# Patient Record
Sex: MALE | Race: WHITE | NOT HISPANIC OR LATINO | Employment: OTHER | URBAN - METROPOLITAN AREA
[De-identification: names, ages, dates, MRNs, and addresses within clinical notes are randomized per-mention and may not be internally consistent; named-entity substitution may affect disease eponyms.]

---

## 2019-09-16 ENCOUNTER — OFFICE VISIT (OUTPATIENT)
Dept: PULMONOLOGY | Facility: CLINIC | Age: 74
End: 2019-09-16

## 2019-09-16 VITALS
BODY MASS INDEX: 36.19 KG/M2 | SYSTOLIC BLOOD PRESSURE: 108 MMHG | OXYGEN SATURATION: 97 % | HEIGHT: 71 IN | TEMPERATURE: 98.2 F | DIASTOLIC BLOOD PRESSURE: 68 MMHG | HEART RATE: 70 BPM | RESPIRATION RATE: 18 BRPM | WEIGHT: 258.5 LBS

## 2019-09-16 DIAGNOSIS — R06.02 SHORTNESS OF BREATH: Primary | ICD-10-CM

## 2019-09-16 DIAGNOSIS — G47.33 OSA (OBSTRUCTIVE SLEEP APNEA): ICD-10-CM

## 2019-09-16 DIAGNOSIS — I25.5 ISCHEMIC CARDIOMYOPATHY: ICD-10-CM

## 2019-09-16 DIAGNOSIS — I48.0 PAROXYSMAL ATRIAL FIBRILLATION (HCC): ICD-10-CM

## 2019-09-16 PROBLEM — I25.10 CAD (CORONARY ARTERY DISEASE): Status: ACTIVE | Noted: 2019-09-16

## 2019-09-16 PROBLEM — E78.5 HYPERLIPEMIA: Status: ACTIVE | Noted: 2019-09-16

## 2019-09-16 PROBLEM — E03.9 HYPOTHYROIDISM (ACQUIRED): Status: ACTIVE | Noted: 2019-09-16

## 2019-09-16 PROBLEM — E78.2 MIXED HYPERLIPIDEMIA: Status: ACTIVE | Noted: 2019-09-16

## 2019-09-16 PROCEDURE — 99203 OFFICE O/P NEW LOW 30 MIN: CPT | Performed by: NURSE PRACTITIONER

## 2019-09-16 PROCEDURE — 94726 PLETHYSMOGRAPHY LUNG VOLUMES: CPT | Performed by: NURSE PRACTITIONER

## 2019-09-16 PROCEDURE — 94010 BREATHING CAPACITY TEST: CPT | Performed by: NURSE PRACTITIONER

## 2019-09-16 PROCEDURE — 94729 DIFFUSING CAPACITY: CPT | Performed by: NURSE PRACTITIONER

## 2019-09-16 RX ORDER — METOPROLOL SUCCINATE 50 MG/1
TABLET, EXTENDED RELEASE ORAL
Refills: 5 | COMMUNITY
Start: 2019-07-05

## 2019-09-16 RX ORDER — APIXABAN 5 MG/1
5 TABLET, FILM COATED ORAL 2 TIMES DAILY
Refills: 1 | COMMUNITY
Start: 2019-07-25

## 2019-09-16 RX ORDER — ATORVASTATIN CALCIUM 20 MG/1
20 TABLET, FILM COATED ORAL NIGHTLY
Refills: 1 | COMMUNITY
Start: 2019-08-09

## 2019-09-16 RX ORDER — LEVOTHYROXINE SODIUM 0.12 MG/1
TABLET ORAL
Refills: 1 | COMMUNITY
Start: 2019-07-26

## 2019-09-16 RX ORDER — AMIODARONE HYDROCHLORIDE 200 MG/1
100 TABLET ORAL DAILY
Refills: 3 | COMMUNITY
Start: 2019-07-15

## 2019-09-16 RX ORDER — NITROGLYCERIN 0.4 MG/1
0.4 TABLET SUBLINGUAL
COMMUNITY

## 2019-09-16 RX ORDER — FEBUXOSTAT 40 MG/1
40 TABLET ORAL DAILY
Refills: 3 | COMMUNITY
Start: 2019-07-26

## 2019-09-16 NOTE — PROGRESS NOTES
[facility] Pulmonary Evaluation    CHIEF COMPLAINT    Shortness of breath     Refered by:  See Jarquin MD        HISTORY OF PRESENT ILLNESS    Artur Rose is a 74 y.o.male here today for evaluation of some dyspnea.  Actually his dyspnea has improved since his heart failure has been managed.  He does have a history of coronary disease with acute MIs and stenting and heart failure.  At this time he denies any shortness of breath.  He walks on a frequent basis.  He has an occasional cough but rare sputum production mostly in the morning secondary to sinus drainage.    He does have a history of thyroid dysfunction secondary to his amiodarone on Synthroid.    He did have a chronic cough to ACE inhibitors in the past but once discontinued it stopped.    He has no history of tobacco abuse.  No significant occupational exposures.          Patient Active Problem List   Diagnosis   • Paroxysmal atrial fibrillation (CMS/HCC)   • Mixed hyperlipidemia   • Hypothyroidism (acquired)   • CAD (coronary artery disease)   • Ischemic cardiomyopathy   • GHAZALA (obstructive sleep apnea)       Allergies   Allergen Reactions   • Codeine Rash       Current Outpatient Medications:   •  amiodarone (PACERONE) 200 MG tablet, Take 200 mg by mouth Every 12 (Twelve) Hours., Disp: , Rfl: 3  •  atorvastatin (LIPITOR) 20 MG tablet, Take 20 mg by mouth Every Night., Disp: , Rfl: 1  •  ELIQUIS 5 MG tablet tablet, Take 5 mg by mouth 2 (Two) Times a Day., Disp: , Rfl: 1  •  levothyroxine (SYNTHROID, LEVOTHROID) 125 MCG tablet, TAKE ONE TABLET BY MOUTH EACH MORNING ON AN EMPTY STOMACH FOR THYROID, Disp: , Rfl: 1  •  metoprolol succinate XL (TOPROL-XL) 50 MG 24 hr tablet, TAKE 1 TABLET BY MOUTH DAILY STOP DILTIAZEM, Disp: , Rfl: 5  •  nitroglycerin (NITROSTAT) 0.4 MG SL tablet, Place 0.4 mg under the tongue Every 5 (Five) Minutes As Needed for Chest Pain. Take no more than 3 doses in 15 minutes., Disp: , Rfl:   •  ULORIC 40 MG tablet, Take 40 mg by mouth  "Daily., Disp: , Rfl: 3    MEDICATION LIST AND ALLERGIES REVIEWED.    Social History     Tobacco Use   • Smoking status: Never Smoker   • Smokeless tobacco: Never Used   Substance Use Topics   • Alcohol use: No     Frequency: Never   • Drug use: No       FAMILY AND SOCIAL HISTORY REVIEWED AND UPDATED IN EPIC    Review of Systems   Constitutional: Negative for chills, fatigue, fever and unexpected weight change.   HENT: Negative for congestion, nosebleeds, postnasal drip, rhinorrhea, sinus pressure and trouble swallowing.    Respiratory: Negative for cough, chest tightness, shortness of breath and wheezing.    Cardiovascular: Negative for chest pain and leg swelling.   Gastrointestinal: Negative for abdominal pain, constipation, diarrhea, nausea and vomiting.   Genitourinary: Negative for dysuria, frequency, hematuria and urgency.   Musculoskeletal: Negative for myalgias.   Neurological: Negative for dizziness, weakness, numbness and headaches.   All other systems reviewed and are negative.  .    /68 (BP Location: Left arm, Patient Position: Sitting, Cuff Size: Adult)   Pulse 70   Temp 98.2 °F (36.8 °C)   Resp 18   Ht 180.3 cm (71\")   Wt 117 kg (258 lb 8 oz)   SpO2 97% Comment: room air at rest  BMI 36.05 kg/m²     There is no immunization history on file for this patient.    Physical Exam   Constitutional: He is oriented to person, place, and time. He appears well-developed and well-nourished.   HENT:   Head: Normocephalic and atraumatic.   Eyes: EOM are normal. Pupils are equal, round, and reactive to light.   Neck: Normal range of motion. Neck supple.   Cardiovascular: Normal rate and regular rhythm.   No murmur heard.  Pulmonary/Chest: Effort normal and breath sounds normal. No respiratory distress. He has no wheezes. He has no rales.   Abdominal: Soft. Bowel sounds are normal. He exhibits no distension.   Musculoskeletal: Normal range of motion. He exhibits no edema.   Neurological: He is alert and " oriented to person, place, and time.   Skin: Skin is warm and dry. No erythema.   Psychiatric: He has a normal mood and affect. His behavior is normal.   Vitals reviewed.      RESULTS    PFTs done in the office today, and read by me:  Normal PFTs, no obstruction or restriction.  FVC is 3.89, 87%.  Total lung capacity 6.1 L, 91%    PA/LAT CXR done in the office today, and reviewed by me:    EXAMINATION: XR CHEST PA AND LATERAL- 9/16/2019     INDICATION: SEE DIAGNOSIS; R06.02-Shortness of breath      COMPARISON: NONE     FINDINGS: Cardiac size within normal limits. Pulmonary vascularity  within normal limits.     Chronic changes including prior granulomas involvement without focal  opacification or consolidation. No pneumothorax or pleural effusion.  Degenerative changes of the spine.         IMPRESSION:  No acute cardiothoracic process.      PROBLEM LIST    Problem List Items Addressed This Visit        Cardiovascular and Mediastinum    Paroxysmal atrial fibrillation (CMS/HCC)    Overview     On Eliquis and Amiodarone          Relevant Medications    metoprolol succinate XL (TOPROL-XL) 50 MG 24 hr tablet    nitroglycerin (NITROSTAT) 0.4 MG SL tablet    Ischemic cardiomyopathy    Overview     EF 55-60% in 12/2018         Relevant Medications    metoprolol succinate XL (TOPROL-XL) 50 MG 24 hr tablet    nitroglycerin (NITROSTAT) 0.4 MG SL tablet       Respiratory    GHAZALA (obstructive sleep apnea)      Other Visit Diagnoses     Shortness of breath    -  Primary    Relevant Orders    XR Chest PA & Lateral (Completed)    Pulmonary Function Test (Completed)            DISCUSSION      He is here for evaluation today with a history of paroxysmal A. fib on amiodarone with some dyspnea.  Rule out any interstitial lung disease secondary to amiodarone.    At this time there is no changes in his chest x-ray, his PFTs are normal without any evidence of restrictive lung disease.    We did discuss following up with he develops a  chronic cough or worsening dyspnea with activity.    I would recommend he continue to follow-up annually with PFTs for evaluation, given his other side effects on prior medications.      Mari Adler, APRN  09/16/20191:24 PM  Electronically signed     Please note that portions of this note were completed with a voice recognition program. Efforts were made to edit the dictations, but occasionally words are mistranscribed.      CC: Akua Cunningham MD

## 2021-01-21 DIAGNOSIS — Z01.812 BLOOD TESTS PRIOR TO TREATMENT OR PROCEDURE: Primary | ICD-10-CM

## 2021-01-26 DIAGNOSIS — Z01.812 BLOOD TESTS PRIOR TO TREATMENT OR PROCEDURE: Primary | ICD-10-CM

## 2021-03-04 ENCOUNTER — OFFICE VISIT (OUTPATIENT)
Dept: PULMONOLOGY | Facility: CLINIC | Age: 76
End: 2021-03-04

## 2021-03-04 VITALS
HEIGHT: 71 IN | SYSTOLIC BLOOD PRESSURE: 120 MMHG | OXYGEN SATURATION: 96 % | WEIGHT: 263 LBS | TEMPERATURE: 98 F | HEART RATE: 74 BPM | DIASTOLIC BLOOD PRESSURE: 70 MMHG | BODY MASS INDEX: 36.82 KG/M2

## 2021-03-04 DIAGNOSIS — G47.33 OSA (OBSTRUCTIVE SLEEP APNEA): ICD-10-CM

## 2021-03-04 DIAGNOSIS — R06.00 DYSPNEA, UNSPECIFIED TYPE: Primary | ICD-10-CM

## 2021-03-04 DIAGNOSIS — I25.5 ISCHEMIC CARDIOMYOPATHY: ICD-10-CM

## 2021-03-04 PROCEDURE — 99214 OFFICE O/P EST MOD 30 MIN: CPT | Performed by: NURSE PRACTITIONER

## 2021-03-04 PROCEDURE — 94726 PLETHYSMOGRAPHY LUNG VOLUMES: CPT | Performed by: NURSE PRACTITIONER

## 2021-03-04 PROCEDURE — 94729 DIFFUSING CAPACITY: CPT | Performed by: NURSE PRACTITIONER

## 2021-03-04 PROCEDURE — 94375 RESPIRATORY FLOW VOLUME LOOP: CPT | Performed by: NURSE PRACTITIONER

## 2021-03-04 NOTE — PROGRESS NOTES
"[facility] Pulmonary Follow up      Chief Complaint  Shortness of Breath and Follow-up    Subjective          Artur Rose presents to [facility] PULMONARY AND CRITICAL CARE MEDICINE for routine follow-up.  He was initially seen in September 2019 and did not follow-up last year secondary to the COVID-19 pandemic.    We received him an evaluation from his cardiologist for some shortness of breath.  His shortness of breath is at baseline.  He gets mildly dyspneic when he tries to walk uphill from his woodworking shop to his home.  He has no dyspnea with minimal activities or activities of daily living.    He has had an overall decline in his activity over the last year with the pandemic and has gained about 15 pounds.     He has no cough or sputum production.  No chest tightness or wheezing.  No recent acute respiratory illnesses.    He does have a history of amiodarone use and was concerned about any interstitial changes related to his amiodarone.  He is currently on 100 mg daily.      Objective     Vital Signs:   /70   Pulse 74   Temp 98 °F (36.7 °C)   Ht 180.3 cm (71\")   Wt 119 kg (263 lb)   SpO2 96% Comment: resting, room air  BMI 36.68 kg/m²       Immunization History   Administered Date(s) Administered   • COVID-19 (PFIZER) 01/22/2021, 02/19/2021   • Flu Vaccine Quad PF >36MO 10/25/2018, 10/09/2019   • Fluad Quad 65+ 10/15/2020   • Influenza, Unspecified 10/17/2017   • Tdap 10/17/2017       Physical Exam  Vitals signs reviewed.   Constitutional:       Appearance: He is well-developed.   HENT:      Head: Normocephalic and atraumatic.   Eyes:      Pupils: Pupils are equal, round, and reactive to light.   Neck:      Musculoskeletal: Normal range of motion and neck supple.   Cardiovascular:      Rate and Rhythm: Normal rate and regular rhythm.      Heart sounds: No murmur.   Pulmonary:      Effort: Pulmonary effort is normal. No respiratory distress.      Breath sounds: Normal breath " sounds. No wheezing or rales.   Abdominal:      General: Bowel sounds are normal. There is no distension.      Palpations: Abdomen is soft.   Musculoskeletal: Normal range of motion.   Skin:     General: Skin is warm and dry.      Findings: No erythema.   Neurological:      Mental Status: He is alert and oriented to person, place, and time.   Psychiatric:         Behavior: Behavior normal.          Result Review :            PFTs done in the office today:  No obstruction or restriction, normal total lung capacity at 5.64, 84 L.  Normal DLCO    No significant changes from 2019.      PA and lateral chest x-ray done the office today reviewed by me  Awaiting final MD interpretation                 Assessment and Plan    Problem List Items Addressed This Visit        Cardiac and Vasculature    Ischemic cardiomyopathy    Overview     EF 55-60% in 12/2018         Relevant Orders    XR Chest PA & Lateral       Sleep    GHAZALA (obstructive sleep apnea)    Overview     Intolerant to CPAP           Other Visit Diagnoses     Dyspnea, unspecified type    -  Primary    Relevant Orders    Pulmonary Function Test (Completed)    XR Chest PA & Lateral          No signs of any interstitial lung disease or interstitial changes.  His PFTs have no restriction, his chest x-ray was benign, he has no rales on exam.    He will follow up annually for evaluation with full PFTs and a chest x-ray.    Follow-up sooner if any changes in his symptoms.        I spent 35 minutes caring for Artur on this date of service. This time includes time spent by me in the following activities:preparing for the visit, reviewing tests, obtaining and/or reviewing a separately obtained history, performing a medically appropriate examination and/or evaluation , counseling and educating the patient/family/caregiver, ordering medications, tests, or procedures, referring and communicating with other health care professionals , documenting information in the medical record  and independently interpreting results and communicating that information with the patient/family/caregiver     Follow Up     Return in about 1 year (around 3/4/2022).  Patient was given instructions and counseling regarding his condition or for health maintenance advice. Please see specific information pulled into the AVS if appropriate.     SOL Vaughan, ACNP  [facility]  Electronically signed

## 2022-03-04 ENCOUNTER — OFFICE VISIT (OUTPATIENT)
Dept: PULMONOLOGY | Facility: CLINIC | Age: 77
End: 2022-03-04

## 2022-03-04 VITALS
DIASTOLIC BLOOD PRESSURE: 84 MMHG | OXYGEN SATURATION: 96 % | HEIGHT: 71 IN | BODY MASS INDEX: 37.66 KG/M2 | WEIGHT: 269 LBS | HEART RATE: 68 BPM | SYSTOLIC BLOOD PRESSURE: 130 MMHG | TEMPERATURE: 97.8 F

## 2022-03-04 DIAGNOSIS — R06.00 DYSPNEA, UNSPECIFIED TYPE: Primary | ICD-10-CM

## 2022-03-04 DIAGNOSIS — G47.33 OSA (OBSTRUCTIVE SLEEP APNEA): ICD-10-CM

## 2022-03-04 DIAGNOSIS — E66.9 OBESITY (BMI 30-39.9): ICD-10-CM

## 2022-03-04 PROCEDURE — 94726 PLETHYSMOGRAPHY LUNG VOLUMES: CPT | Performed by: NURSE PRACTITIONER

## 2022-03-04 PROCEDURE — 99214 OFFICE O/P EST MOD 30 MIN: CPT | Performed by: NURSE PRACTITIONER

## 2022-03-04 PROCEDURE — 94010 BREATHING CAPACITY TEST: CPT | Performed by: NURSE PRACTITIONER

## 2022-03-04 PROCEDURE — 94729 DIFFUSING CAPACITY: CPT | Performed by: NURSE PRACTITIONER

## 2022-03-04 NOTE — PROGRESS NOTES
"[facility] Pulmonary Follow up      Chief Complaint  Shortness of Breath (follow up )    Subjective          Artur Rose presents to [facility]ULMON[facility]ARE MEDICINE for annual follow-up on his shortness of breath.  He does have ischemic cardiomyopathy as well as A. fib.  That has been well controlled recently with no acute issues.    He does have a history of obstructive sleep apnea.  It is several years ago when he was much heavier he did have a sleep study and tried to use a CPAP.  He was very intolerant to the CPAP.  Since then he has lost a significant amount of weight, but has regained some in the last couple of years.  He does notice a difference in his overall activity tolerance when he has gained weight.    He denies any significant shortness of breath with activity.  No cough or sputum production.      Objective     Vital Signs:   /84   Pulse 68   Temp 97.8 °F (36.6 °C)   Ht 180.3 cm (70.98\")   Wt 122 kg (269 lb)   SpO2 96%   BMI 37.53 kg/m²       Immunization History   Administered Date(s) Administered   • COVID-19 (PFIZER) PURPLE CAP 01/22/2021, 02/19/2021, 09/27/2021   • Flu Vaccine Quad PF >36MO 10/25/2018, 10/09/2019   • Fluad Quad 65+ 10/15/2020   • Influenza, Unspecified 10/17/2017   • Tdap 10/17/2017       Physical Exam  Vitals reviewed.   Constitutional:       General: He is not in acute distress.     Appearance: He is well-developed. He is obese. He is not ill-appearing.   HENT:      Head: Normocephalic and atraumatic.   Eyes:      Pupils: Pupils are equal, round, and reactive to light.   Cardiovascular:      Rate and Rhythm: Normal rate and regular rhythm.      Heart sounds: No murmur heard.      Pulmonary:      Effort: Pulmonary effort is normal. No respiratory distress.      Breath sounds: Normal breath sounds. No wheezing or rales.   Abdominal:      General: Bowel sounds are normal. There is no distension.      Palpations: Abdomen is soft. "   Musculoskeletal:         General: Normal range of motion.      Cervical back: Normal range of motion and neck supple.   Skin:     General: Skin is warm and dry.      Findings: No erythema.   Neurological:      Mental Status: He is alert and oriented to person, place, and time.   Psychiatric:         Behavior: Behavior normal.          Result Review :            PFTs done in the office today:  Normal PFTs with an FVC of 4.13, 94% predicted and total lung capacity of 6.0 L, 91% predicted.  Normal DLCO.    PA and lateral chest x-ray done the office today reviewed by me  Awaiting final MD interpretation                 Assessment and Plan    Problem List Items Addressed This Visit        Endocrine and Metabolic    Obesity (BMI 30-39.9)       Sleep    GHAZALA (obstructive sleep apnea)    Overview     Intolerant to CPAP           Other Visit Diagnoses     Dyspnea, unspecified type    -  Primary    Relevant Orders    XR Chest PA & Lateral    Pulmonary Function Test        Initially Mr. Rose came over from his cardiologist for evaluation of his dyspnea.  He is on amiodarone, there is been no signs of any interstitial changes on his chest x-ray and no restriction on his PFTs.    He does have a history of obstructive sleep apnea, but has since then lost quite a bit of weight.  We did discuss continuing to monitor his weight and being active throughout the summer will help his overall activity tolerance and shortness of breath.    In continued follow-up here in the office as needed, annually with chest x-rays.  He has had 2 normal PFTs with no PFTs are required unless his symptoms change.      Follow Up     No follow-ups on file.  Patient was given instructions and counseling regarding his condition or for health maintenance advice. Please see specific information pulled into the AVS if appropriate.     I spent 35 minutes caring for Artur on this date of service. This time includes time spent by me in the following  activities:preparing for the visit, reviewing tests, obtaining and/or reviewing a separately obtained history, performing a medically appropriate examination and/or evaluation , counseling and educating the patient/family/caregiver, ordering medications, tests, or procedures, referring and communicating with other health care professionals , documenting information in the medical record and independently interpreting results and communicating that information with the patient/family/caregiver        SOL Vaughan, ACNP  [facility]  Electronically signed

## 2023-03-21 ENCOUNTER — OFFICE VISIT (OUTPATIENT)
Dept: PULMONOLOGY | Facility: CLINIC | Age: 78
End: 2023-03-21
Payer: MEDICARE

## 2023-03-21 VITALS
DIASTOLIC BLOOD PRESSURE: 80 MMHG | BODY MASS INDEX: 37.76 KG/M2 | TEMPERATURE: 98.2 F | SYSTOLIC BLOOD PRESSURE: 120 MMHG | WEIGHT: 270.6 LBS | HEART RATE: 88 BPM | OXYGEN SATURATION: 97 %

## 2023-03-21 DIAGNOSIS — R06.00 DYSPNEA, UNSPECIFIED TYPE: Primary | ICD-10-CM

## 2023-03-21 DIAGNOSIS — G47.33 OSA (OBSTRUCTIVE SLEEP APNEA): ICD-10-CM

## 2023-03-21 PROCEDURE — 1160F RVW MEDS BY RX/DR IN RCRD: CPT | Performed by: NURSE PRACTITIONER

## 2023-03-21 PROCEDURE — 99214 OFFICE O/P EST MOD 30 MIN: CPT | Performed by: NURSE PRACTITIONER

## 2023-03-21 PROCEDURE — 1159F MED LIST DOCD IN RCRD: CPT | Performed by: NURSE PRACTITIONER

## 2023-03-21 PROCEDURE — 94010 BREATHING CAPACITY TEST: CPT | Performed by: NURSE PRACTITIONER

## 2023-03-21 PROCEDURE — 94729 DIFFUSING CAPACITY: CPT | Performed by: NURSE PRACTITIONER

## 2023-03-21 PROCEDURE — 94726 PLETHYSMOGRAPHY LUNG VOLUMES: CPT | Performed by: NURSE PRACTITIONER

## 2023-03-21 NOTE — PROGRESS NOTES
[facility] Pulmonary Follow up      Chief Complaint  Shortness of Breath and Follow-up    Subjective          Artur Rose presents to [facility]UL[facility]IC[facility] for follow-up on his shortness of breath.    We follow him routinely in the office for evaluation of his chronic dyspnea.  He does have history of struct of sleep apnea is fairly intolerant to CPAP and has lost some weight.    He also is on amiodarone so we will follow-up with PFTs and chest x-ray annually to assure no underlying interstitial changes.    Has not been as active over the winter.  His wife had an acute illness and he had to care for her.  He also had 2 rounds of bronchitis over the last year.  Overall he does feel like his shortness of breath has increased with activity.  He also states he has gained some weight this year.          Objective     Vital Signs:   /80   Pulse 88   Temp 98.2 °F (36.8 °C)   Wt 123 kg (270 lb 9.6 oz)   SpO2 97% Comment: resting, room air  BMI 37.76 kg/m²       Immunization History   Administered Date(s) Administered   • COVID-19 (MODERNA) 1st, 2nd, 3rd Dose Only 04/01/2022   • COVID-19 (MODERNA) BIVALENT BOOSTER 12+YRS 09/20/2022   • COVID-19 (PFIZER) PURPLE CAP 01/22/2021, 02/19/2021, 09/27/2021   • Flu Vaccine Quad PF >36MO 10/25/2018, 10/09/2019   • Fluad Quad 65+ 10/15/2020, 09/22/2021, 09/20/2022   • Fluzone Quad >6mos (Multi-dose) 10/25/2018, 10/09/2019   • Influenza, Unspecified 10/17/2017   • Tdap 10/17/2017, 03/31/2021       Physical Exam  Vitals reviewed.   Constitutional:       General: He is not in acute distress.     Appearance: He is well-developed. He is obese.   HENT:      Head: Normocephalic and atraumatic.   Eyes:      Pupils: Pupils are equal, round, and reactive to light.   Cardiovascular:      Rate and Rhythm: Normal rate and regular rhythm.      Heart sounds: No murmur heard.  Pulmonary:      Effort: Pulmonary effort is normal. No respiratory  distress.      Breath sounds: Normal breath sounds. No wheezing or rales.   Abdominal:      General: Bowel sounds are normal. There is no distension.      Palpations: Abdomen is soft.   Musculoskeletal:         General: Normal range of motion.      Cervical back: Normal range of motion and neck supple.   Skin:     General: Skin is warm and dry.      Findings: No erythema.   Neurological:      Mental Status: He is alert and oriented to person, place, and time.   Psychiatric:         Behavior: Behavior normal.          Result Review :            PFTs done in the office today:  No obstruction restriction his FVC is 3.48, 80% predicted and has dropped some since last year 4.13, 94% predicted.  Normal DLCO.    PA and lateral chest x-ray done the office today reviewed by me  Awaiting final MD interpretation                 Assessment and Plan    Problem List Items Addressed This Visit        Sleep    GHAZALA (obstructive sleep apnea)    Overview     Intolerant to CPAP        Other Visit Diagnoses     Dyspnea, unspecified type    -  Primary    Relevant Orders    XR Chest PA & Lateral (Completed)    Pulmonary Function Test (Completed)        We reviewed his testing today in the office.  His PFTs are decreased some from last year but are still normal at 80%  His chest x-ray is unchanged, no evidence of any interstitial changes.  .  He is having a bit more short of breath with activity but no hypoxemia.  We did discuss is likely related to his less activity over the winter while he was caring for his wife.  Now that she is feeling better they both plan on walking and being active daily again.    He will follow-up in a year with his routine follow-up, sooner if he notices any worsening shortness of breath.    Follow Up     Return in about 1 year (around 3/21/2024).  Patient was given instructions and counseling regarding his condition or for health maintenance advice. Please see specific information pulled into the AVS if  appropriate.     I spent 32 minutes caring for Artur on this date of service. This time includes time spent by me in the following activities:preparing for the visit, reviewing tests, obtaining and/or reviewing a separately obtained history, performing a medically appropriate examination and/or evaluation , counseling and educating the patient/family/caregiver, ordering medications, tests, or procedures, referring and communicating with other health care professionals , documenting information in the medical record and independently interpreting results and communicating that information with the patient/family/caregiver      SOL Vaughan, ACNP  [facility]  Electronically signed

## 2024-03-22 ENCOUNTER — OFFICE VISIT (OUTPATIENT)
Dept: PULMONOLOGY | Facility: CLINIC | Age: 79
End: 2024-03-22
Payer: MEDICARE

## 2024-03-22 VITALS
TEMPERATURE: 97.7 F | OXYGEN SATURATION: 98 % | WEIGHT: 256 LBS | BODY MASS INDEX: 35.72 KG/M2 | DIASTOLIC BLOOD PRESSURE: 72 MMHG | HEART RATE: 65 BPM | SYSTOLIC BLOOD PRESSURE: 112 MMHG

## 2024-03-22 DIAGNOSIS — R06.00 DYSPNEA, UNSPECIFIED TYPE: Primary | ICD-10-CM

## 2024-03-22 DIAGNOSIS — Z23 IMMUNIZATION DUE: ICD-10-CM

## 2024-03-22 DIAGNOSIS — G47.33 OSA (OBSTRUCTIVE SLEEP APNEA): ICD-10-CM

## 2024-03-22 RX ORDER — ROSUVASTATIN CALCIUM 10 MG/1
TABLET, COATED ORAL
COMMUNITY
Start: 2024-01-23

## 2024-03-22 RX ORDER — FUROSEMIDE 20 MG/1
20 TABLET ORAL DAILY PRN
COMMUNITY
Start: 2024-03-13

## 2024-03-22 NOTE — LETTER
[facility]  Vaccine Consent Form    Patient Name:  Artur Valentinurch  Patient :  1945     Vaccine(s) Ordered    Pneumococcal Conjugate Vaccine 20-Valent (PCV20)        Screening Checklist  The following questions should be completed prior to vaccination. If you answer “yes” to any question, it does not necessarily mean you should not be vaccinated. It just means we may need to clarify or ask more questions. If a question is unclear, please ask your healthcare provider to explain it.    Yes No   Any fever or moderate to severe illness today (mild illness and/or antibiotic treatment are not contraindications)?     Do you have a history of a serious reaction to any previous vaccinations, such as anaphylaxis, encephalopathy within 7 days, Guillain-Sisseton syndrome within 6 weeks, seizure?     Have you received any live vaccine(s) (e.g MMR, HARRISON) or any other vaccines in the last month (to ensure duplicate doses aren't given)?     Do you have an anaphylactic allergy to latex (DTaP, DTaP-IPV, Hep A, Hep B, MenB, RV, Td, Tdap), baker’s yeast (Hep B, HPV), polysorbates (RSV, nirsevimab, PCV 20, Rotavirrus, Tdap, Shingrix), or gelatin (HARRISON, MMR)?     Do you have an anaphylactic allergy to neomycin (Rabies, HARRISON, MMR, IPV, Hep A), polymyxin B (IPV), or streptomycin (IPV)?      Any cancer, leukemia, AIDS, or other immune system disorder? (HARRISON, MMR, RV)     Do you have a parent, brother, or sister with an immune system problem (if immune competence of vaccine recipient clinically verified, can proceed)? (MMR, HARRISON)     Any recent steroid treatments for >2 weeks, chemotherapy, or radiation treatment? (HARRISON, MMR)     Have you received antibody-containing blood transfusions or IVIG in the past 11 months (recommended interval is dependent on product)? (MMR, HARRISON)     Have you taken antiviral drugs (acyclovir, famciclovir, valacyclovir for HARRISON) in the last 24 or 48 hours, respectively?      Are you pregnant or planning to become  "pregnant within 1 month? (HARRISON, MMR, HPV, IPV, MenB, Abrexvy; For Hep B- refer to Engerix-B; For RSV - Abrysvo is indicated for 32-36 weeks of pregnancy from September to January)     For infants, have you ever been told your child has had intussusception or a medical emergency involving obstruction of the intestine (Rotavirus)? If not for an infant, can skip this question.         *Ordering Physicians/APC should be consulted if \"yes\" is checked by the patient or guardian above.  I have received, read, and understand the Vaccine Information Statement (VIS) for each vaccine ordered.  I have considered my or my child's health status as well as the health status of my close contacts.  I have taken the opportunity to discuss my vaccine questions with my or my child's health care provider.   I have requested that the ordered vaccine(s) be given to me or my child.  I understand the benefits and risks of the vaccines.  I understand that I should remain in the clinic for 15 minutes after receiving the vaccine(s).  _________________________________________________________  Signature of Patient or Parent/Legal Guardian ____________________  Date     "

## 2024-03-22 NOTE — PROGRESS NOTES
[facility] Pulmonary Follow up      Chief Complaint  Shortness of Breath (Follow up)    Subjective          Artur Rose presents to [facility] for annual follow-up on his PFTs.  He has a history of no chronic dyspnea, he follows up annually for full PFTs and chest x-ray secondary to amiodarone use.    He says he has done well this year.  Not had any recent acute pulmonary illnesses.  He did have a bit of worsening shortness of breath couple weeks ago but it ended up being related to some volume overload.  Took some Lasix and now is feeling much better, he has some to use as needed.  He follows up with his cardiologist, Dr. Jarquin for next week.      Objective     Vital Signs:   /72   Pulse 65   Temp 97.7 °F (36.5 °C)   Wt 116 kg (256 lb)   SpO2 98% Comment: Room air at rest  BMI 35.72 kg/m²       Immunization History   Administered Date(s) Administered    ABRYSVO (RSV, 60+ or pregnant women 32-36 wks) 09/20/2023    COVID-19 (MODERNA) 1st,2nd,3rd Dose Monovalent 04/01/2022    COVID-19 (MODERNA) BIVALENT 12+YRS 09/20/2022    COVID-19 (PFIZER) Purple Cap Monovalent 01/22/2021, 02/19/2021, 09/27/2021    COVID-19 F23 (MODERNA) 12YRS+ (SPIKEVAX) 09/20/2023    Flu Vaccine Quad PF >36MO 10/25/2018, 10/09/2019    Fluad Quad 65+ 10/15/2020, 09/22/2021, 09/20/2022    Fluzone High-Dose 65+yrs 09/20/2023    Fluzone Quad >6mos (Multi-dose) 10/25/2018, 10/09/2019    Influenza, Unspecified 10/17/2017, 09/10/2023    Pneumococcal Conjugate 20-Valent (PCV20) 03/22/2024    Shingrix 10/10/2023, 12/19/2023    Tdap 10/17/2017, 03/31/2021       Physical Exam  Vitals reviewed.   Constitutional:       Appearance: He is well-developed.   HENT:      Head: Normocephalic and atraumatic.   Eyes:      Pupils: Pupils are equal, round, and reactive to light.   Cardiovascular:      Rate and Rhythm: Normal rate and regular rhythm.      Heart sounds: No murmur heard.  Pulmonary:      Effort:  Pulmonary effort is normal. No respiratory distress.      Breath sounds: Normal breath sounds. No wheezing or rales.   Abdominal:      General: Bowel sounds are normal. There is no distension.      Palpations: Abdomen is soft.   Musculoskeletal:         General: Normal range of motion.      Cervical back: Normal range of motion and neck supple.   Skin:     General: Skin is warm and dry.      Findings: No erythema.   Neurological:      Mental Status: He is alert and oriented to person, place, and time.   Psychiatric:         Behavior: Behavior normal.          Result Review :            PFTs done in the office today:  No obstruction or restriction FVC is 3.46, 77% addicted no change since last year.      PA and lateral chest x-ray done the office today reviewed by me  Awaiting final MD interpretation                 Assessment and Plan    Problem List Items Addressed This Visit          Sleep    GHAZALA (obstructive sleep apnea)    Overview     Intolerant to CPAP          Other Visit Diagnoses       Dyspnea, unspecified type    -  Primary    Relevant Orders    XR Chest PA & Lateral    Spirometry with Diffusion Capacity & Lung Volumes (Completed)    Immunization due        Relevant Orders    Pneumococcal Conjugate Vaccine 20-Valent (PCV20) (Completed)          We went over his testing today, they are unchanged from last year.  There is no signs of any interstitial changes, no hypoxemia.    He did go ahead and get his pneumonia vaccine today.    Routine follow-up annually or as needed with any acute issues.    Follow Up     No follow-ups on file.  Patient was given instructions and counseling regarding his condition or for health maintenance advice. Please see specific information pulled into the AVS if appropriate.     I spent 34 minutes caring for Artur on this date of service. This time includes time spent by me in the following activities:preparing for the visit, reviewing tests, obtaining and/or reviewing a separately  obtained history, performing a medically appropriate examination and/or evaluation , counseling and educating the patient/family/caregiver, ordering medications, tests, or procedures, referring and communicating with other health care professionals , documenting information in the medical record, and independently interpreting results and communicating that information with the patient/family/caregiver    Excluding time spent on other separate services such as performing procedures or test interpretation, if applicable    Moderate level of Medical Decision Making complexity based on 2 or more chronic stable illnesses and prescription drug management.    Mari Adler APRN, ACNP  [facility] Pulmonary Critical Care Medicine  Electronically signed

## 2024-03-22 NOTE — LETTER
March 22, 2024     See Jarquin MD  [address]  [facility] [address]  [facility] [address]    Patient: Artur Rose   YOB: 1945   Date of Visit: 3/22/2024     Dear Dr. Milka MD:    Thank you for referring Artur Rose to me for evaluation. Below are the relevant portions of my assessment and plan of care.    If you have questions, please do not hesitate to call me. I look forward to following Artur along with you.         Sincerely,        Mari Adler, SOL        CC: No Recipients      Progress Notes:  [facility] Pulmonary Follow up      Chief Complaint  Shortness of Breath (Follow up)    Subjective         Artur Rose presents to [facility] for annual follow-up on his PFTs.  He has a history of no chronic dyspnea, he follows up annually for full PFTs and chest x-ray secondary to amiodarone use.    He says he has done well this year.  Not had any recent acute pulmonary illnesses.  He did have a bit of worsening shortness of breath couple weeks ago but it ended up being related to some volume overload.  Took some Lasix and now is feeling much better, he has some to use as needed.  He follows up with his cardiologist, Dr. Jarquin for next week.      Objective    Vital Signs:   /72   Pulse 65   Temp 97.7 °F (36.5 °C)   Wt 116 kg (256 lb)   SpO2 98% Comment: Room air at rest  BMI 35.72 kg/m²       Immunization History   Administered Date(s) Administered   • ABRYSVO (RSV, 60+ or pregnant women 32-36 wks) 09/20/2023   • COVID-19 (MODERNA) 1st,2nd,3rd Dose Monovalent 04/01/2022   • COVID-19 (MODERNA) BIVALENT 12+YRS 09/20/2022   • COVID-19 (PFIZER) Purple Cap Monovalent 01/22/2021, 02/19/2021, 09/27/2021   • COVID-19 F23 (MODERNA) 12YRS+ (SPIKEVAX) 09/20/2023   • Flu Vaccine Quad PF >36MO 10/25/2018, 10/09/2019   • Fluad Quad 65+ 10/15/2020, 09/22/2021, 09/20/2022   • Fluzone High-Dose 65+yrs 09/20/2023   • Fluzone Quad >6mos (Multi-dose)  10/25/2018, 10/09/2019   • Influenza, Unspecified 10/17/2017, 09/10/2023   • Pneumococcal Conjugate 20-Valent (PCV20) 03/22/2024   • Shingrix 10/10/2023, 12/19/2023   • Tdap 10/17/2017, 03/31/2021       Physical Exam  Vitals reviewed.   Constitutional:       Appearance: He is well-developed.   HENT:      Head: Normocephalic and atraumatic.   Eyes:      Pupils: Pupils are equal, round, and reactive to light.   Cardiovascular:      Rate and Rhythm: Normal rate and regular rhythm.      Heart sounds: No murmur heard.  Pulmonary:      Effort: Pulmonary effort is normal. No respiratory distress.      Breath sounds: Normal breath sounds. No wheezing or rales.   Abdominal:      General: Bowel sounds are normal. There is no distension.      Palpations: Abdomen is soft.   Musculoskeletal:         General: Normal range of motion.      Cervical back: Normal range of motion and neck supple.   Skin:     General: Skin is warm and dry.      Findings: No erythema.   Neurological:      Mental Status: He is alert and oriented to person, place, and time.   Psychiatric:         Behavior: Behavior normal.          Result Review:            PFTs done in the office today:  No obstruction or restriction FVC is 3.46, 77% addicted no change since last year.      PA and lateral chest x-ray done the office today reviewed by me  Awaiting final MD interpretation                 Assessment and Plan    Problem List Items Addressed This Visit          Sleep    GHAZALA (obstructive sleep apnea)    Overview     Intolerant to CPAP          Other Visit Diagnoses       Dyspnea, unspecified type    -  Primary    Relevant Orders    XR Chest PA & Lateral    Spirometry with Diffusion Capacity & Lung Volumes (Completed)    Immunization due        Relevant Orders    Pneumococcal Conjugate Vaccine 20-Valent (PCV20) (Completed)          We went over his testing today, they are unchanged from last year.  There is no signs of any interstitial changes, no  hypoxemia.    He did go ahead and get his pneumonia vaccine today.    Routine follow-up annually or as needed with any acute issues.    Follow Up     No follow-ups on file.  Patient was given instructions and counseling regarding his condition or for health maintenance advice. Please see specific information pulled into the AVS if appropriate.     I spent 34 minutes caring for Artur on this date of service. This time includes time spent by me in the following activities:preparing for the visit, reviewing tests, obtaining and/or reviewing a separately obtained history, performing a medically appropriate examination and/or evaluation , counseling and educating the patient/family/caregiver, ordering medications, tests, or procedures, referring and communicating with other health care professionals , documenting information in the medical record, and independently interpreting results and communicating that information with the patient/family/caregiver    Excluding time spent on other separate services such as performing procedures or test interpretation, if applicable    Moderate level of Medical Decision Making complexity based on 2 or more chronic stable illnesses and prescription drug management.    Mari Adelr APRN, ACNP  [facility]  Electronically signed

## 2025-02-07 ENCOUNTER — OFFICE VISIT (OUTPATIENT)
Age: 80
End: 2025-02-07
Payer: MEDICARE

## 2025-02-07 VITALS
DIASTOLIC BLOOD PRESSURE: 77 MMHG | WEIGHT: 255.2 LBS | HEIGHT: 72 IN | BODY MASS INDEX: 34.57 KG/M2 | SYSTOLIC BLOOD PRESSURE: 122 MMHG | HEART RATE: 59 BPM

## 2025-02-07 DIAGNOSIS — E78.2 MIXED HYPERLIPIDEMIA: ICD-10-CM

## 2025-02-07 DIAGNOSIS — I48.0 PAROXYSMAL ATRIAL FIBRILLATION: ICD-10-CM

## 2025-02-07 DIAGNOSIS — I25.118 CORONARY ARTERY DISEASE OF NATIVE ARTERY OF NATIVE HEART WITH STABLE ANGINA PECTORIS: Primary | ICD-10-CM

## 2025-02-07 DIAGNOSIS — I10 PRIMARY HYPERTENSION: ICD-10-CM

## 2025-02-07 PROCEDURE — 99214 OFFICE O/P EST MOD 30 MIN: CPT | Performed by: INTERNAL MEDICINE

## 2025-02-07 RX ORDER — UBIDECARENONE 100 MG
100 CAPSULE ORAL DAILY
COMMUNITY

## 2025-02-07 RX ORDER — KETOCONAZOLE 20 MG/ML
1 SHAMPOO, SUSPENSION TOPICAL WEEKLY
COMMUNITY
Start: 2024-12-23 | End: 2025-02-07

## 2025-02-07 RX ORDER — LEVOTHYROXINE SODIUM 125 UG/1
125 TABLET ORAL
COMMUNITY

## 2025-02-07 NOTE — ASSESSMENT & PLAN NOTE
Will continue rosuvastatin 20 mg once a day.  Will do FLP LFTs in the next 3 months.        EMT/paramedic

## 2025-02-07 NOTE — PROGRESS NOTES
Cardiology Follow-Up Note     Name: Artur Rose  :   1945  PCP: Akua Cunningham MD  Date:   2025  Department: [facility] [place] [facility] CARDIOLOGY  [address]  [facility] [address]  Fax [phone]  Phone [phone]    Chief Complaint   Patient presents with    Coronary Artery Disease       Subjective     History of Present Illness  Artur Rose is a 79 y.o. male who presents today for follow-up, patient last visit was on 3/15/2024, the patient has known history of coronary artery disease status post non-STEMI, he had last cardiac catheterization done on 2022 and he received a stent to the LAD.  The patient has chronic kidney disease with elevated potassium level and was started on Lokelma.  Patient also has paroxysmal atrial fibrillation is on Eliquis.  The patient had elevated potassium levels, he took Lasix as advised by nephrologist and the potassium got better.  He did not take Lokelma.      Current Outpatient Medications:     amiodarone (PACERONE) 200 MG tablet, Take 0.5 tablets by mouth Daily., Disp: , Rfl: 3    Cholecalciferol (Vitamin D3) 25 MCG (1000 UT) capsule, Take 1 capsule by mouth Daily., Disp: , Rfl:     coenzyme Q10 100 MG capsule, Take 1 capsule by mouth Daily., Disp: , Rfl:     ELIQUIS 5 MG tablet tablet, Take 1 tablet by mouth 2 (Two) Times a Day., Disp: , Rfl: 1    furosemide (LASIX) 20 MG tablet, Take 1 tablet by mouth Daily As Needed. for swelling., Disp: , Rfl:     levothyroxine (SYNTHROID, LEVOTHROID) 125 MCG tablet, TAKE ONE TABLET BY MOUTH EACH MORNING ON AN EMPTY STOMACH FOR THYROID, Disp: , Rfl: 1    metoprolol succinate XL (TOPROL-XL) 50 MG 24 hr tablet, TAKE 1 TABLET BY MOUTH DAILY STOP DILTIAZEM (Patient taking differently: 0.5 tablets Daily.), Disp: , Rfl: 5    nitroglycerin (NITROSTAT) 0.4 MG SL tablet, Place 1 tablet under the tongue Every 5 (Five) Minutes As Needed for Chest Pain. Take no more  "than 3 doses in 15 minutes., Disp: , Rfl:     rosuvastatin (CRESTOR) 10 MG tablet, , Disp: , Rfl:     ULORIC 40 MG tablet, Take 1 tablet by mouth Daily., Disp: , Rfl: 3    atorvastatin (LIPITOR) 20 MG tablet, Take 1 tablet by mouth Every Night., Disp: , Rfl: 1    Cholecalciferol (Vitamin D3) 25 MCG (1000 UT) capsule, Take 1 capsule by mouth Daily. (Patient not taking: Reported on 2/7/2025), Disp: , Rfl:     levothyroxine (SYNTHROID, LEVOTHROID) 125 MCG tablet, Take 1 tablet by mouth Every Morning. (Patient not taking: Reported on 2/7/2025), Disp: , Rfl:     Objective     Vital Signs:  /77 (BP Location: Right arm, Patient Position: Sitting)   Pulse 59   Ht 182.9 cm (72\")   Wt 116 kg (255 lb 3.2 oz)   BMI 34.61 kg/m²   Estimated body mass index is 34.61 kg/m² as calculated from the following:    Height as of this encounter: 182.9 cm (72\").    Weight as of this encounter: 116 kg (255 lb 3.2 oz).             Vitals reviewed.   Constitutional:       Appearance: Normal and healthy appearance.   Eyes:      Pupils: Pupils are equal, round, and reactive to light.   Pulmonary:      Effort: Pulmonary effort is normal.   Chest:      Chest wall: Not tender to palpatation.   Cardiovascular:      PMI at left midclavicular line. Normal rate. Regular rhythm.      No gallop.    Pulses:     Intact distal pulses.   Edema:     Peripheral edema absent.   Skin:     General: Skin is warm.   Psychiatric:         Behavior: Behavior is cooperative.              Data Review:   No results found for: \"GLUCOSE\", \"BUN\", \"CREATININE\", \"EGFRIFNONA\", \"EGFRIFAFRI\", \"BCR\", \"K\", \"CO2\", \"CALCIUM\", \"ALBUMIN\", \"BILIRUBIN\", \"AST\", \"ALT\"  No results found for: \"CHOL\", \"CHLPL\", \"TRIG\", \"HDL\", \"LDL\", \"LDLDIRECT\"   No results found for: \"WBC\", \"RBC\", \"HGB\", \"HCT\", \"MCV\", \"PLT\"  No results found for: \"TSH\"  No results found for: \"HGBA1C\"  No results found for: \"INR\", \"PROTIME\"    Labs dated 1/24/2025 revealed potassium 5.3, GFR 51  Labs dated 1/31/2025 " reveals potassium 4.6 GFR 52.  Transthoracic echo dated 12/8/2023  Ejection fraction 57% with moderately dilated left atrium, diastolic function indeterminate.  The RV systolic pressure is 18 mmHg  DC cardioversion dated 3/25/2024: Successful DC cardioversion from atrial fibrillation to sinus rhythm.  The patient continued Eliquis.    Assessment and Plan     Assessment & Plan  Coronary artery disease of native artery of native heart with stable angina pectoris  The patient is on Eliquis, he will continue Eliquis, no chest pain, shortness of breath on exertion not getting worse.    Orders:    Lipid Panel; Future    CBC & Differential; Future    Basic Metabolic Panel; Future    Paroxysmal atrial fibrillation  Patient will take Eliquis for atrial fibrillation notify if any problems.  Patient is on amiodarone 200 mg half tablet once a day.  I discussed with the patient about the antifungal medication ketoconazole and patient will discontinue it unless absolutely necessary.  Will also repeat CBC BMP in 3 months.       Primary hypertension    Patient will continue his metoprolol XL 50 mg once a day.       Mixed hyperlipidemia     Will continue rosuvastatin 20 mg once a day.  Will do FLP LFTs in the next 3 months.         Advised to continue current cardiac medications. Please notify of any issues. Discussed with the patient compliance with medical management and follow-up.     Follow Up  Return in about 3 months (around 5/7/2025).    Call if you have any significant symptoms or go to the [facility] Emergency room if possible.     See Jarquin MD, FACC,[facility].  [facility]    Part of this note may be an electronic transcription/translation of spoken language to printed text using the Dragon Dictation System.

## 2025-02-07 NOTE — ASSESSMENT & PLAN NOTE
Patient will take Eliquis for atrial fibrillation notify if any problems.  Patient is on amiodarone 200 mg half tablet once a day.  I discussed with the patient about the antifungal medication ketoconazole and patient will discontinue it unless absolutely necessary.  Will also repeat CBC BMP in 3 months.

## 2025-02-07 NOTE — ASSESSMENT & PLAN NOTE
The patient is on Eliquis, he will continue Eliquis, no chest pain, shortness of breath on exertion not getting worse.    Orders:    Lipid Panel; Future    CBC & Differential; Future    Basic Metabolic Panel; Future

## 2025-03-21 ENCOUNTER — TELEPHONE (OUTPATIENT)
Age: 80
End: 2025-03-21
Payer: MEDICARE

## 2025-03-21 RX ORDER — ROSUVASTATIN CALCIUM 10 MG/1
10 TABLET, COATED ORAL NIGHTLY
Qty: 90 TABLET | Refills: 0 | Status: SHIPPED | OUTPATIENT
Start: 2025-03-21 | End: 2025-03-21 | Stop reason: SDUPTHER

## 2025-03-21 RX ORDER — ROSUVASTATIN CALCIUM 10 MG/1
10 TABLET, COATED ORAL NIGHTLY
Qty: 90 TABLET | Refills: 0 | Status: SHIPPED | OUTPATIENT
Start: 2025-03-21

## 2025-03-21 NOTE — TELEPHONE ENCOUNTER
Incoming Refill Request      Medication requested (name and dose): CRESTOR 10MG     Pharmacy where request should be sent: [facility]    Additional details provided by patient: 2-3 DAYS LEFT    Best call back number: [phone]    Does the patient have less than a 3 day supply:  [x] Yes  [] No    Lara Sandifer, RegSched Rep  03/21/25, 08:38 EDT

## 2025-04-11 ENCOUNTER — OFFICE VISIT (OUTPATIENT)
Dept: PULMONOLOGY | Facility: CLINIC | Age: 80
End: 2025-04-11
Payer: MEDICARE

## 2025-04-11 VITALS
HEART RATE: 76 BPM | TEMPERATURE: 98 F | OXYGEN SATURATION: 97 % | BODY MASS INDEX: 33.72 KG/M2 | HEIGHT: 72 IN | WEIGHT: 249 LBS | SYSTOLIC BLOOD PRESSURE: 122 MMHG | DIASTOLIC BLOOD PRESSURE: 82 MMHG

## 2025-04-11 DIAGNOSIS — G47.33 OSA (OBSTRUCTIVE SLEEP APNEA): ICD-10-CM

## 2025-04-11 DIAGNOSIS — R06.00 DYSPNEA, UNSPECIFIED TYPE: Primary | ICD-10-CM

## 2025-04-11 DIAGNOSIS — I48.0 PAROXYSMAL ATRIAL FIBRILLATION: ICD-10-CM

## 2025-04-11 NOTE — PROGRESS NOTES
"[facility] Pulmonary Follow up      Chief Complaint  Shortness of Breath and Follow-up    Subjective          Artur Rose presents to [facility] for routine follow-up on testing.  We follow him here in the office for some chronic dyspnea with activity.  We did go ahead and follow-up with PFTs and chest x-ray due to chronically being on amiodarone.    He has not noticed any changes in his chronic dyspnea with activity.  He is does pretty well on regular flat surfaces, but will get a bit short of breath with exercise.  He also relates some of that to his underlying heart disease.    He continues to follow-up with Dr. Jarquin.    He does have a history of struct of sleep apnea but is very intolerant to PAP therapy.  He says he sleeps very really well, he does wake up frequently to go to the restroom.  He denies any dyspnea at night.  He feels fairly well rested when he wakes.        Objective     Vital Signs:   /82   Pulse 76   Temp 98 °F (36.7 °C)   Ht 182.9 cm (72\")   Wt 113 kg (249 lb)   SpO2 97% Comment: resting, room air  BMI 33.77 kg/m²       Immunization History   Administered Date(s) Administered    31-influenza Vac Quardvalent Preservativ 09/18/2023    ABRYSVO (RSV, 60+ or pregnant women 32-36 wks) 09/20/2023    COVID-19 (MODERNA) 12YRS+ (SPIKEVAX) 09/20/2023, 09/13/2024    COVID-19 (MODERNA) 1st,2nd,3rd Dose Monovalent 04/01/2022    COVID-19 (MODERNA) BIVALENT 12+YRS 09/20/2022    COVID-19 (PFIZER) Purple Cap Monovalent 01/22/2021, 02/19/2021, 09/27/2021    COVID-19 (UNSPECIFIED) 09/18/2023    Flu Vaccine Quad PF >36MO 10/25/2018, 10/09/2019    Fluad Quad 65+ 10/15/2020, 09/22/2021, 09/20/2022    Fluzone High-Dose 65+YRS 09/13/2024    Fluzone High-Dose 65+yrs 09/20/2023    Fluzone Quad >6mos (Multi-dose) 10/25/2018, 10/09/2019    Influenza, Unspecified 10/17/2017, 10/01/2022, 09/10/2023    Pneumococcal Conjugate 20-Valent (PCV20) 03/22/2024    " Pneumococcal Polysaccharide (PPSV23) 10/01/2022    Shingrix 10/10/2023, 12/19/2023    Tdap 10/17/2017, 03/31/2021       Physical Exam  Vitals reviewed.   Constitutional:       Appearance: He is well-developed.   HENT:      Head: Normocephalic and atraumatic.   Eyes:      Pupils: Pupils are equal, round, and reactive to light.   Cardiovascular:      Rate and Rhythm: Normal rate and regular rhythm.      Heart sounds: No murmur heard.  Pulmonary:      Effort: Pulmonary effort is normal. No respiratory distress.      Breath sounds: Normal breath sounds. No wheezing or rales.   Abdominal:      General: Bowel sounds are normal. There is no distension.      Palpations: Abdomen is soft.   Musculoskeletal:         General: Normal range of motion.      Cervical back: Normal range of motion and neck supple.   Skin:     General: Skin is warm and dry.      Findings: No erythema.   Neurological:      Mental Status: He is alert and oriented to person, place, and time.   Psychiatric:         Behavior: Behavior normal.          Result Review :            PFTs done in the office today:  Mild restrictive lung disease with an FVC of 2.98, 71% predicted and slightly decreased total lung capacity of 5.90, 78% predicted.  Not significantly decreased from last year.  Normal DLCO    2023 3.48, 80% predicted  2022 4.13, 94% predicted  2021 3.50, 79% predicted  2019 3.89, 87% predicted      PA and lateral chest x-ray done the office today reviewed by me  Awaiting final MD interpretation                 Assessment and Plan    Problem List Items Addressed This Visit          Cardiac and Vasculature    Paroxysmal atrial fibrillation    Overview   On Eliquis and Amiodarone             Sleep    GHAZALA (obstructive sleep apnea)    Overview   Intolerant to CPAP          Other Visit Diagnoses         Dyspnea, unspecified type    -  Primary    Relevant Orders    XR Chest PA & Lateral    Spirometry with Diffusion Capacity & Lung Volumes (Completed)           We follow Mr. Rose in the office for dyspnea with activity.  He does get full PFTs and chest x-ray annually while being on amiodarone.  There has been a slight decline in his PFTs over the years.  Initially he was around 4 L and he is down to about 3 L, but not significantly changed from last year.  There has been no significant changes on his chest x-ray nor in his overall dyspnea.    Will continue to follow closely.      Follow Up     Return in about 1 year (around 4/11/2026).  Patient was given instructions and counseling regarding his condition or for health maintenance advice. Please see specific information pulled into the AVS if appropriate.     I spent 35 minutes caring for Artur on this date of service. This time includes time spent by me in the following activities:preparing for the visit, reviewing tests, obtaining and/or reviewing a separately obtained history, performing a medically appropriate examination and/or evaluation , counseling and educating the patient/family/caregiver, ordering medications, tests, or procedures, referring and communicating with other health care professionals , documenting information in the medical record, and independently interpreting results and communicating that information with the patient/family/caregiver    Excluding time spent on other separate services such as performing procedures or test interpretation, if applicable    SOL Vaughan, ACNP  [facility]  Electronically signed

## 2025-05-01 ENCOUNTER — TELEPHONE (OUTPATIENT)
Age: 80
End: 2025-05-01
Payer: MEDICARE

## 2025-05-01 NOTE — TELEPHONE ENCOUNTER
"This was received from Patient....  \"I need additional prescription from Dr. Jarquin to obtain his specific testing in preperation for this requested appointment. Thank You, please advise.\".... Would someone on clinical staff obtain and print for me so I can call and reschedule appt and send to patient with printed lab orders?   "

## 2025-05-01 NOTE — TELEPHONE ENCOUNTER
I spoke to Patient and he will be keeping original appt date and time on 6/9/25. I will be mailing him his appointment reminder along with the lab orders written on 2/7/25 f/u appt date by Dr. Jarquin.

## 2025-05-01 NOTE — TELEPHONE ENCOUNTER
"This was received from Patient....  \"I need additional prescription from Dr. Jarquin to obtain his specific testing in preperation for this requested appointment. Thank You, please advise.\".... Would someone on clinical staff obtain and print for me so I can call and reschedule appt and send to patient with printed lab orders.   "

## 2025-05-01 NOTE — TELEPHONE ENCOUNTER
I APOLOGIZE.  LAB ORDERS ALREADY WRITTEN IN 02/7/2025 F/U APPOINTMENT.  PLEASE DISREGARD REQUEST.    THANKS, JEOVANNY

## 2025-06-07 NOTE — ASSESSMENT & PLAN NOTE
Continue Eliquis 5 mg twice a day, patient is also on amiodarone understanding fully the side effects of this medication, he is at 100 mg once a day.  [id number] score 3.

## 2025-06-07 NOTE — PROGRESS NOTES
Cardiology Follow-Up Note     Name: Artur Rose  :   1945  PCP: Akua Cunningham MD  Date:   2025  Department: [facility] [place] [facility] CARDIOLOGY  [address]  [place] [address]  Fax [phone]  Phone [phone]    Chief Complaint   Patient presents with    Hyperlipidemia    Hypertension    Coronary Artery Disease     Problem list:  Coronary artery disease  2012 acute non-ST relation myocardial infarction.  Hypertension.  Intra-aortic balloon pump/clot in the LAD.  Successful stenting of the LAD.  2015 non-STEMI EF 65% patent stent to the LAD.  2023 echocardiogram EF 57% dilated left atrium, diastolic function indeterminate.  Paroxysmal atrial fibrillation  7/10/2018 successful ORTEGA/DC cardioversion to sinus rhythm.  3/25/2024 successful DC cardioversion from A-fib to sinus rhythm.  Hypertension benign essential  Hyperlipidemia  Syncope  Thyroid disease  Dilated aortic root by echo dated 2018  Hand surgery      Subjective     History of Present Illness  Artur Rose is a 79 y.o. male who presents today for follow-up, patient last visit was on 3/15/2024, the patient has known history of coronary artery disease status post non-STEMI, he had last cardiac catheterization done on 2022 and he received a stent to the LAD.  The patient has chronic kidney disease with elevated potassium level and was started on Lokelma.  Patient also has paroxysmal atrial fibrillation is on Eliquis.  The patient had elevated potassium levels, he took Lasix as advised by nephrologist and the potassium got better.  He did not take Lokelma.      Current Outpatient Medications:     amiodarone (PACERONE) 200 MG tablet, Take 0.5 tablets by mouth Daily., Disp: , Rfl: 3    apixaban (Eliquis) 5 MG tablet tablet, TAKE 1 TABLET BY MOUTH TWICE DAILY, Disp: 180 tablet, Rfl: 1    Cholecalciferol (Vitamin D3) 25 MCG (1000 UT) capsule, Take 1  "capsule by mouth Daily., Disp: , Rfl:     coenzyme Q10 100 MG capsule, Take 1 capsule by mouth Daily., Disp: , Rfl:     furosemide (LASIX) 20 MG tablet, Take 1 tablet by mouth Daily As Needed. for swelling., Disp: , Rfl:     levothyroxine (SYNTHROID, LEVOTHROID) 125 MCG tablet, Take 1 tablet by mouth Every Morning., Disp: , Rfl:     metoprolol succinate XL (TOPROL-XL) 25 MG 24 hr tablet, Take 1 tablet by mouth Daily., Disp: , Rfl:     nitroglycerin (NITROSTAT) 0.4 MG SL tablet, Place 1 tablet under the tongue Every 5 (Five) Minutes As Needed for Chest Pain. Take no more than 3 doses in 15 minutes., Disp: , Rfl:     rosuvastatin (CRESTOR) 10 MG tablet, Take 1 tablet by mouth Every Night. (Patient taking differently: Take 0.5 tablets by mouth Every Night.), Disp: 90 tablet, Rfl: 0    ULORIC 40 MG tablet, Take 1 tablet by mouth Daily., Disp: , Rfl: 3    Objective     Vital Signs:  /73 (BP Location: Right arm, Patient Position: Sitting)   Pulse 56   Ht 182.9 cm (72\")   Wt 113 kg (249 lb 4.8 oz)   BMI 33.81 kg/m²   Estimated body mass index is 33.81 kg/m² as calculated from the following:    Height as of this encounter: 182.9 cm (72\").    Weight as of this encounter: 113 kg (249 lb 4.8 oz).             Vitals reviewed.   Constitutional:       Appearance: Normal and healthy appearance.   Eyes:      Pupils: Pupils are equal, round, and reactive to light.   Pulmonary:      Effort: Pulmonary effort is normal.   Chest:      Chest wall: Not tender to palpatation.   Cardiovascular:      PMI at left midclavicular line. Normal rate. Regular rhythm.      No gallop.    Pulses:     Intact distal pulses.   Edema:     Peripheral edema absent.   Skin:     General: Skin is warm.   Psychiatric:         Behavior: Behavior is cooperative.              Data Review:   No results found for: \"GLUCOSE\", \"BUN\", \"CREATININE\", \"EGFRIFNONA\", \"EGFRIFAFRI\", \"BCR\", \"K\", \"CO2\", \"CALCIUM\", \"ALBUMIN\", \"BILIRUBIN\", \"AST\", \"ALT\"  No results found " "for: \"CHOL\", \"CHLPL\", \"TRIG\", \"HDL\", \"LDL\", \"LDLDIRECT\"   No results found for: \"WBC\", \"RBC\", \"HGB\", \"HCT\", \"MCV\", \"PLT\"  No results found for: \"TSH\"  No results found for: \"HGBA1C\"  No results found for: \"INR\", \"PROTIME\"    .Labs dated 1/24/2025 revealed potassium 5.3, GFR 51  Labs dated 1/31/2025 reveals potassium 4.6 GFR 52.  Labs dated 5/14/2025 creatinine 1.46 with a GFR 50.  Potassium 4.7.  CBC okay.  LDL less than 30 triglycerides 74.  Assessment and Plan     Assessment & Plan  Paroxysmal atrial fibrillation  Continue Eliquis 5 mg twice a day, patient is also on amiodarone understanding fully the side effects of this medication, he is at 100 mg once a day.  [id number] score 3.       Coronary artery disease involving native coronary artery of native heart without angina pectoris  Coronary Artery Disease (OPTIONAL): Coronary artery disease is stable.  Continue current treatment regimen. Dietary sodium restriction.  Cardiac status will be reassessed in 3 months.  Patient is on Eliquis will avoid adding aspirin at this point due to risk of bleeding.       Mixed hyperlipidemia   Lipid abnormalities are stable    Plan:  Continue same medication/s without change.      Discussed medication dosage, use, side effects, and goals of treatment in detail.    Counseled patient on lifestyle modifications to help control hyperlipidemia.   Advised patient to exercise for 150 minutes weekly. (30 minute brisk walk, 5 days a week for example)    Patient Treatment Goals:   LDL goal is less than 70    Followup in 6 months.  The patient will continue rosuvastatin 10 mg once a day, diet modification risk factor modification.       Benign essential hypertension  Hypertension is stable and controlled. BP is good at home.   Continue current treatment regimen.  Weight loss.  Ambulatory blood pressure monitoring.  Blood pressure will be reassessed in 3 months.  Continue metoprolol succinate 25 mg once a day.  The patient well aware of " the fact that he has episodes of hyperkalemia and has used Lokelma in the past.  Close follow-up with potassium as needed with labs on a regular basis         Advised to continue current cardiac medications. Please notify of any issues. Discussed with the patient compliance with medical management and follow-up.     Follow Up  Return in about 3 months (around 9/9/2025).    Call if you have any significant symptoms or go to the [facility] Emergency room if possible.     See Jarquin MD, FACC,[facility].  [facility]    Part of this note may be an electronic transcription/translation of spoken language to printed text using the Dragon Dictation System.

## 2025-06-07 NOTE — ASSESSMENT & PLAN NOTE
Coronary Artery Disease (OPTIONAL): Coronary artery disease is stable.  Continue current treatment regimen. Dietary sodium restriction.  Cardiac status will be reassessed in 3 months.  Patient is on Eliquis will avoid adding aspirin at this point due to risk of bleeding.

## 2025-06-07 NOTE — ASSESSMENT & PLAN NOTE
Lipid abnormalities are stable    Plan:  Continue same medication/s without change.      Discussed medication dosage, use, side effects, and goals of treatment in detail.    Counseled patient on lifestyle modifications to help control hyperlipidemia.   Advised patient to exercise for 150 minutes weekly. (30 minute brisk walk, 5 days a week for example)    Patient Treatment Goals:   LDL goal is less than 70    Followup in 6 months.  The patient will continue rosuvastatin 10 mg once a day, diet modification risk factor modification.

## 2025-06-09 ENCOUNTER — PATIENT ROUNDING (BHMG ONLY) (OUTPATIENT)
Age: 80
End: 2025-06-09

## 2025-06-09 ENCOUNTER — OFFICE VISIT (OUTPATIENT)
Age: 80
End: 2025-06-09
Payer: MEDICARE

## 2025-06-09 VITALS
WEIGHT: 249.3 LBS | DIASTOLIC BLOOD PRESSURE: 73 MMHG | BODY MASS INDEX: 33.77 KG/M2 | SYSTOLIC BLOOD PRESSURE: 134 MMHG | HEART RATE: 56 BPM | HEIGHT: 72 IN

## 2025-06-09 DIAGNOSIS — I48.0 PAROXYSMAL ATRIAL FIBRILLATION: Primary | ICD-10-CM

## 2025-06-09 DIAGNOSIS — E78.2 MIXED HYPERLIPIDEMIA: ICD-10-CM

## 2025-06-09 DIAGNOSIS — I25.10 CORONARY ARTERY DISEASE INVOLVING NATIVE CORONARY ARTERY OF NATIVE HEART WITHOUT ANGINA PECTORIS: ICD-10-CM

## 2025-06-09 DIAGNOSIS — I10 BENIGN ESSENTIAL HYPERTENSION: ICD-10-CM

## 2025-06-09 PROCEDURE — 99214 OFFICE O/P EST MOD 30 MIN: CPT | Performed by: INTERNAL MEDICINE

## 2025-06-09 PROCEDURE — 1160F RVW MEDS BY RX/DR IN RCRD: CPT | Performed by: INTERNAL MEDICINE

## 2025-06-09 PROCEDURE — 1159F MED LIST DOCD IN RCRD: CPT | Performed by: INTERNAL MEDICINE

## 2025-06-09 RX ORDER — APIXABAN 5 MG/1
5 TABLET, FILM COATED ORAL EVERY 12 HOURS SCHEDULED
Qty: 180 TABLET | Refills: 1 | Status: SHIPPED | OUTPATIENT
Start: 2025-06-09 | End: 2025-06-09 | Stop reason: SDUPTHER

## 2025-06-09 RX ORDER — METOPROLOL SUCCINATE 25 MG/1
25 TABLET, EXTENDED RELEASE ORAL DAILY
COMMUNITY
End: 2025-06-09 | Stop reason: SDUPTHER

## 2025-06-09 RX ORDER — ROSUVASTATIN CALCIUM 10 MG/1
10 TABLET, COATED ORAL NIGHTLY
Qty: 90 TABLET | Refills: 0 | Status: SHIPPED | OUTPATIENT
Start: 2025-06-09

## 2025-06-09 RX ORDER — METOPROLOL SUCCINATE 25 MG/1
25 TABLET, EXTENDED RELEASE ORAL DAILY
Qty: 90 TABLET | Refills: 1 | Status: SHIPPED | OUTPATIENT
Start: 2025-06-09

## 2025-06-09 RX ORDER — NITROGLYCERIN 0.4 MG/1
0.4 TABLET SUBLINGUAL
Qty: 25 TABLET | Refills: 2 | Status: SHIPPED | OUTPATIENT
Start: 2025-06-09

## 2025-06-09 NOTE — PROGRESS NOTES
My name is Suzanne Wells, and I am the Practice Manager for [facility].    I would like to thank you for being a loyal patient. If you do not mind, I would like to ask you some questions about your recent visit with us. Please feel free to reply if you wish to provide us with feedback on your visit with our practice.    First, could you tell me what went well with your recent visit?    Secondly, we are always looking for ways to make our patients' experiences even better. Do you have any recommendations on what we can do to improve your experience?    Finally, overall were you satisfied with your visit with us as a [facility] facility?    Over the next few days, you will be receiving a Patient Experience Survey. Please consider taking the survey, as it helps [facility] in improving their patient care.    Thank you for taking the time to answer our questions today.    I hope you have a good day.

## 2025-06-09 NOTE — TELEPHONE ENCOUNTER
Rx Refill Note  Requested Prescriptions     Pending Prescriptions Disp Refills    Eliquis 5 MG tablet tablet [Pharmacy Med Name: ELIQUIS 5 MG TABLET 5 Tablet] 180 tablet 2     Sig: TAKE 1 TABLET BY MOUTH TWICE DAILY      Last office visit with prescribing clinician: 2/7/2025   Last telemedicine visit with prescribing clinician: Visit date not found   Next office visit with prescribing clinician: 6/9/2025                        Pharmacy Info    Last Fill Date:  Rx Written Date:   Prescribed Qty:   Additional Details from Pharmacy:    Patient passed protocols per aubrie.         Vonda Holder MA  06/09/25, 09:49 EDT

## 2025-08-04 RX ORDER — AMIODARONE HYDROCHLORIDE 200 MG/1
100 TABLET ORAL DAILY
Qty: 45 TABLET | Refills: 1 | Status: SHIPPED | OUTPATIENT
Start: 2025-08-04